# Patient Record
Sex: MALE
[De-identification: names, ages, dates, MRNs, and addresses within clinical notes are randomized per-mention and may not be internally consistent; named-entity substitution may affect disease eponyms.]

---

## 2018-01-16 ENCOUNTER — HOSPITAL ENCOUNTER (EMERGENCY)
Dept: HOSPITAL 5 - ED | Age: 53
LOS: 1 days | Discharge: HOME | End: 2018-01-17
Payer: SELF-PAY

## 2018-01-16 VITALS — DIASTOLIC BLOOD PRESSURE: 88 MMHG | SYSTOLIC BLOOD PRESSURE: 130 MMHG

## 2018-01-16 DIAGNOSIS — R10.9: ICD-10-CM

## 2018-01-16 DIAGNOSIS — K40.90: Primary | ICD-10-CM

## 2018-01-16 PROCEDURE — 96375 TX/PRO/DX INJ NEW DRUG ADDON: CPT

## 2018-01-16 PROCEDURE — 36415 COLL VENOUS BLD VENIPUNCTURE: CPT

## 2018-01-16 PROCEDURE — 80053 COMPREHEN METABOLIC PANEL: CPT

## 2018-01-16 PROCEDURE — 81001 URINALYSIS AUTO W/SCOPE: CPT

## 2018-01-16 PROCEDURE — 74177 CT ABD & PELVIS W/CONTRAST: CPT

## 2018-01-16 PROCEDURE — 96374 THER/PROPH/DIAG INJ IV PUSH: CPT

## 2018-01-16 PROCEDURE — 96361 HYDRATE IV INFUSION ADD-ON: CPT

## 2018-01-16 PROCEDURE — 99284 EMERGENCY DEPT VISIT MOD MDM: CPT

## 2018-01-16 PROCEDURE — 85025 COMPLETE CBC W/AUTO DIFF WBC: CPT

## 2018-01-16 NOTE — EMERGENCY DEPARTMENT REPORT
ED Abdominal Pain HPI





- General


Chief Complaint: Urogenital-Male


Stated Complaint: POSSIBLE HERNIA/CONSTIPATION


Time Seen by Provider: 18 22:53


Source: patient


Mode of arrival: Ambulatory


Limitations: No Limitations





- History of Present Illness


Initial Comments: 





Patient is 52 years old male presented to the ER raising a right inguinal hernia

, patient stated that he was able to reduce it before but today he was having 

trouble reducing it.  Patient stated that he had surgery for it before last one 

was in .  Patient denied any vomiting or diarrhea.


MD Complaint: abdominal pain


-: This afternoon


Location: RLQ (right inguinal area)


Radiation: other (scrotum)


Migration to: no migration


Severity: moderate


Severity scale (0 -10): 7


Quality: fullness





- Related Data


 Home Medications











 Medication  Instructions  Recorded  Confirmed  Last Taken


 


No Known Home Medications [No  18 Unknown





Reported Home Medications]    











 Allergies











Allergy/AdvReac Type Severity Reaction Status Date / Time


 


No Known Allergies Allergy   Unverified 18 20:50














ED Review of Systems


ROS: 


Stated complaint: POSSIBLE HERNIA/CONSTIPATION


Other details as noted in HPI





Comment: All other systems reviewed and negative


Constitutional: denies: chills, fever


Respiratory: denies: cough, orthopnea, shortness of breath, SOB with exertion, 

SOB at rest, wheezing


Cardiovascular: denies: chest pain, palpitations, dyspnea on exertion


Gastrointestinal: abdominal pain, nausea.  denies: vomiting, diarrhea, 

constipation, hematemesis, melena, hematochezia


Genitourinary: denies: urgency, dysuria, frequency, hematuria, discharge, 

testicular pain, testicular mass


Neurological: denies: headache, weakness, numbness, paresthesias, confusion, 

abnormal gait





ED Past Medical Hx





- Past Medical History


Additional medical history: hernia





- Surgical History


Additional Surgical History: hernia repair





- Social History


Smoking Status: Never Smoker


Substance Use Type: None





- Medications


Home Medications: 


 Home Medications











 Medication  Instructions  Recorded  Confirmed  Last Taken  Type


 


No Known Home Medications [No  18 Unknown History





Reported Home Medications]     














ED Physical Exam





- General


Limitations: No Limitations


General appearance: alert, in no apparent distress





- Head


Head exam: Present: atraumatic, normocephalic, normal inspection





- Eye


Eye exam: Present: normal appearance, PERRL





- ENT


ENT exam: Present: normal exam, normal orophraynx, mucous membranes moist





- Neck


Neck exam: Present: normal inspection, full ROM.  Absent: tenderness, 

meningismus, lymphadenopathy, thyromegaly





- Respiratory


Respiratory exam: Present: normal lung sounds bilaterally.  Absent: respiratory 

distress, wheezes, rales, rhonchi, chest wall tenderness, accessory muscle use, 

decreased breath sounds, prolonged expiratory





- Cardiovascular


Cardiovascular Exam: Present: tachycardia





- GI/Abdominal


GI/Abdominal exam: Present: soft, normal bowel sounds, hernia.  Absent: 

distended, tenderness, guarding, rebound, rigid, organomegaly, mass, bruit, 

pulsatile mass





- Extremities Exam


Extremities exam: Present: normal inspection, full ROM, normal capillary 

refill.  Absent: tenderness, pedal edema, joint swelling, calf tenderness





- Neurological Exam


Neurological exam: Present: alert, oriented X3, CN II-XII intact, normal gait





- Skin


Skin exam: Present: warm, intact, normal color.  Absent: cyanosis, diaphoretic, 

erythema, urticaria





ED Course


 Vital Signs











  18





  20:38 20:50 22:00


 


Temperature 99.0 F 99 F 98.8 F


 


Pulse Rate 133 H 134 H 


 


Respiratory 26 H 26 H 





Rate   


 


Blood Pressure 205/107 205/107 


 


O2 Sat by Pulse 96 96 





Oximetry   














  18





  23:00 00:00 02:00


 


Temperature   


 


Pulse Rate 112 H 116 H 112 H


 


Respiratory 17 18 24





Rate   


 


Blood Pressure 130/88 130/88 130/88


 


O2 Sat by Pulse  96 97





Oximetry   














- Reevaluation(s)


Reevaluation #1: 





18 03:20


Patient stated that he is feeling much better.  His hernia is completely 

reducible no evidence of obstruction or gangrene.  I informed the patient about 

his CAT scan results and the need to follow up with his surgeon for hernia 

repair.  Patient stated that he will follow up with his surgeon in the next 2-3 

days.





ED Medical Decision Making





- Lab Data


Result diagrams: 


 18 23:49





 18 23:49





- Radiology Data


Radiology results: report reviewed





Referring Physician:   ISRAEL AMARAL


Patient Name:   KIMBERLY SCHWARZ


Patient ID:   [id number]


YOB: 1965


Sex:   Male


Accession:   [id number]


Report Date:   2018


Report Status:   Finalized


Findings


[facility] 


[address] [place] [address] 





Cat Scan Report 


Signed 





Patient: KIMBERLY SCHWARZ MR#: [id number] 


: 1965 Acct:[id number] 


Age/Sex: 52 / M ADM Date: 18 


Loc: ED 


Attending Dr: 








Ordering Physician: ISRAEL AMARAL 


Date of Service: 18 


Procedure(s): CT abdomen pelvis w con 


Accession Number(s): [id number] 





cc: ISRAEL AMARAL 








FINAL REPORT 





PROCEDURE: CT ABDOMEN PELVIS W CON 





TECHNIQUE: Computerized axial tomography of the abdomen and 


pelvis was performed after the IV injection of iodinated nonionic 


contrast. 





HISTORY: abdominal pain 





COMPARISON: No prior studies are available for comparison. 





FINDINGS: 


Visualized lower thorax: No significant abnormality. 





Liver: Normal size and attenuation. 





Spleen: Normal size and attenuation. 





Gallbladder and biliary system: Normal. 





Pancreas: Normal. 





Adrenals: Normal. 





Kidneys: Normal. 





GI tract: The stomach is normal. The small bowel has a normal 


caliber without obstruction. No ileus or enteritis. The cecum and 


appendix are normal. There is moderate fecal debris throughout 


the colon. There are bilateral inguinal hernia as they contain 


fat in loops of nondilated bowel. The right inguinal hernia 


cavity is the large.. 





Lymph nodes and mesentery: Normal. 





Vasculature: Normal. 





Bladder: Normal. 





Reproductive organs: Normal. 





Peritoneum: No free fluid. 





Musculoskeletal structures: No significant abnormality. 





Other: None. 





IMPRESSION: 


There is no evidence of intestinal or urinary tract obstruction. 


No ileus or enteritis. The appendix is normal. 





Bilateral inguinal hernias are noted. They containing fat and a 


loop of nondilated bowel. The hernia cavity on the right is a 


fairly large cavity. On the left there the hernia cavity is 


moderate-sized. 











Transcribed By: [facility] 


Dictated By: BRANDO ALDRIDGE MD 


Electronically Authenticated By: BRANDO ALDRIDGE MD 


Signed Date/Time: 18 











DD/DT: 18 


TD/TT: 18


Critical care attestation.: 


If time is entered above; I have spent that time in minutes in the direct care 

of this critically ill patient, excluding procedure time.








ED Disposition


Clinical Impression: 


 Abdominal pain, Inguinal hernia





Disposition: - TO HOME OR SELFCARE


Is pt being admited?: No


Condition: Stable


Instructions:  Inguinal Hernia (ED), Abdominal Pain (ED)


Referrals: 


BRADLY SMITH DO [Staff Physician] - 3-5 Days

## 2018-01-17 LAB
ALBUMIN SERPL-MCNC: 3.7 G/DL (ref 3.9–5)
ALT SERPL-CCNC: 13 UNITS/L (ref 7–56)
BASOPHILS # (AUTO): 0.1 K/MM3 (ref 0–0.1)
BASOPHILS NFR BLD AUTO: 0.5 % (ref 0–1.8)
BILIRUB UR QL STRIP: (no result)
BLOOD UR QL VISUAL: (no result)
BUN SERPL-MCNC: 11 MG/DL (ref 9–20)
BUN/CREAT SERPL: 16 %
CALCIUM SERPL-MCNC: 8.5 MG/DL (ref 8.4–10.2)
EOSINOPHIL # BLD AUTO: 0.1 K/MM3 (ref 0–0.4)
EOSINOPHIL NFR BLD AUTO: 0.9 % (ref 0–4.3)
HCT VFR BLD CALC: 43.4 % (ref 35.5–45.6)
HEMOLYSIS INDEX: 8
HGB BLD-MCNC: 14.4 GM/DL (ref 11.8–15.2)
HGB S BLD QL SOLY: (no result)
LYMPHOCYTES # BLD AUTO: 1.7 K/MM3 (ref 1.2–5.4)
LYMPHOCYTES NFR BLD AUTO: 12.7 % (ref 13.4–35)
MCH RBC QN AUTO: 30 PG (ref 28–32)
MCHC RBC AUTO-ENTMCNC: 33 % (ref 32–34)
MCV RBC AUTO: 91 FL (ref 84–94)
MONOCYTES # (AUTO): 1 K/MM3 (ref 0–0.8)
MONOCYTES % (AUTO): 7.3 % (ref 0–7.3)
NITRITE UR QL STRIP: (no result)
PH UR STRIP: 7 [PH] (ref 5–7)
PLATELET # BLD: 275 K/MM3 (ref 140–440)
RBC # BLD AUTO: 4.78 M/MM3 (ref 3.65–5.03)
RBC #/AREA URNS HPF: < 1 /HPF (ref 0–6)
UROBILINOGEN UR-MCNC: < 2 MG/DL (ref ?–2)
WBC #/AREA URNS HPF: < 1 /HPF (ref 0–6)

## 2018-01-17 NOTE — CAT SCAN REPORT
FINAL REPORT



PROCEDURE:  CT ABDOMEN PELVIS W CON



TECHNIQUE:  Computerized axial tomography of the abdomen and

pelvis was performed after the IV injection of iodinated nonionic

contrast. 



HISTORY:  abdominal pain 



COMPARISON:  No prior studies are available for comparison.



FINDINGS:  

Visualized lower thorax: No significant abnormality.



Liver: Normal size and attenuation.



Spleen: Normal size and attenuation.



Gallbladder and biliary system: Normal.



Pancreas: Normal.



Adrenals: Normal.



Kidneys: Normal.



GI tract: The stomach is normal. The small bowel has a normal

caliber without obstruction. No ileus or enteritis. The cecum and

appendix are normal. There is moderate fecal debris throughout

the colon. There are bilateral inguinal hernia as they contain

fat in loops of nondilated bowel. The right inguinal hernia

cavity is the large..



Lymph nodes and mesentery: Normal. 



Vasculature: Normal.



Bladder: Normal.



Reproductive organs: Normal.



Peritoneum: No free fluid.



Musculoskeletal structures: No significant abnormality.



Other: None.  



IMPRESSION:  

There is no evidence of intestinal or urinary tract obstruction.

No ileus or enteritis. The appendix is normal.



Bilateral inguinal hernias are noted. They containing fat and a

loop of nondilated bowel. The hernia cavity on the right is a

fairly large cavity. On the left there the hernia cavity is

moderate-sized.

## 2018-01-27 ENCOUNTER — HOSPITAL ENCOUNTER (EMERGENCY)
Dept: HOSPITAL 5 - ED | Age: 53
Discharge: LEFT BEFORE DISCHARGE | End: 2018-01-27
Payer: SELF-PAY

## 2018-01-27 VITALS — SYSTOLIC BLOOD PRESSURE: 133 MMHG | DIASTOLIC BLOOD PRESSURE: 86 MMHG

## 2018-01-27 DIAGNOSIS — Z53.21: Primary | ICD-10-CM

## 2018-01-27 LAB
BASOPHILS # (AUTO): 0.1 K/MM3 (ref 0–0.1)
BASOPHILS NFR BLD AUTO: 0.3 % (ref 0–1.8)
BUN SERPL-MCNC: 9 MG/DL (ref 9–20)
BUN/CREAT SERPL: 13 %
CALCIUM SERPL-MCNC: 9.8 MG/DL (ref 8.4–10.2)
EOSINOPHIL # BLD AUTO: 0 K/MM3 (ref 0–0.4)
EOSINOPHIL NFR BLD AUTO: 0.1 % (ref 0–4.3)
HCT VFR BLD CALC: 52.4 % (ref 35.5–45.6)
HEMOLYSIS INDEX: 13
HGB BLD-MCNC: 17.6 GM/DL (ref 11.8–15.2)
LYMPHOCYTES # BLD AUTO: 0.9 K/MM3 (ref 1.2–5.4)
LYMPHOCYTES NFR BLD AUTO: 5.9 % (ref 13.4–35)
MCH RBC QN AUTO: 31 PG (ref 28–32)
MCHC RBC AUTO-ENTMCNC: 34 % (ref 32–34)
MCV RBC AUTO: 91 FL (ref 84–94)
MONOCYTES # (AUTO): 0.9 K/MM3 (ref 0–0.8)
MONOCYTES % (AUTO): 6.2 % (ref 0–7.3)
PLATELET # BLD: 332 K/MM3 (ref 140–440)
RBC # BLD AUTO: 5.74 M/MM3 (ref 3.65–5.03)

## 2018-01-27 PROCEDURE — 82140 ASSAY OF AMMONIA: CPT

## 2018-01-27 PROCEDURE — 85025 COMPLETE CBC W/AUTO DIFF WBC: CPT

## 2018-01-27 PROCEDURE — 36415 COLL VENOUS BLD VENIPUNCTURE: CPT

## 2018-01-27 PROCEDURE — 80048 BASIC METABOLIC PNL TOTAL CA: CPT
